# Patient Record
Sex: MALE | Race: WHITE | Employment: STUDENT | URBAN - METROPOLITAN AREA
[De-identification: names, ages, dates, MRNs, and addresses within clinical notes are randomized per-mention and may not be internally consistent; named-entity substitution may affect disease eponyms.]

---
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
## 2019-03-07 PROBLEM — H93.299 ABNORMAL AUDITORY PERCEPTION, UNSPECIFIED LATERALITY: Status: ACTIVE | Noted: 2019-03-07

## 2019-03-07 PROBLEM — H69.83 EUSTACHIAN TUBE DYSFUNCTION, BILATERAL: Status: ACTIVE | Noted: 2019-03-07
